# Patient Record
Sex: FEMALE | Race: WHITE | NOT HISPANIC OR LATINO | URBAN - METROPOLITAN AREA
[De-identification: names, ages, dates, MRNs, and addresses within clinical notes are randomized per-mention and may not be internally consistent; named-entity substitution may affect disease eponyms.]

---
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
## 2019-02-10 ENCOUNTER — EMERGENCY (EMERGENCY)
Facility: HOSPITAL | Age: 19
LOS: 1 days | Discharge: HOME | End: 2019-02-10
Attending: EMERGENCY MEDICINE
Payer: COMMERCIAL

## 2019-02-10 VITALS
HEART RATE: 89 BPM | SYSTOLIC BLOOD PRESSURE: 98 MMHG | DIASTOLIC BLOOD PRESSURE: 54 MMHG | RESPIRATION RATE: 18 BRPM | HEIGHT: 55 IN | WEIGHT: 98.11 LBS | OXYGEN SATURATION: 98 % | TEMPERATURE: 98 F

## 2019-02-10 PROBLEM — H91.90 UNSPECIFIED HEARING LOSS, UNSPECIFIED EAR: Chronic | Status: ACTIVE | Noted: 2017-04-20

## 2019-02-10 PROCEDURE — 73110 X-RAY EXAM OF WRIST: CPT | Mod: 26,LT

## 2019-02-10 PROCEDURE — 73110 X-RAY EXAM OF WRIST: CPT

## 2019-02-10 PROCEDURE — 70450 CT HEAD/BRAIN W/O DYE: CPT | Mod: 26

## 2019-02-10 PROCEDURE — 99284 EMERGENCY DEPT VISIT MOD MDM: CPT

## 2019-02-10 PROCEDURE — 70450 CT HEAD/BRAIN W/O DYE: CPT

## 2019-02-10 PROCEDURE — 99284 EMERGENCY DEPT VISIT MOD MDM: CPT | Mod: 25

## 2019-02-10 PROCEDURE — 73130 X-RAY EXAM OF HAND: CPT

## 2019-02-10 PROCEDURE — 73130 X-RAY EXAM OF HAND: CPT | Mod: 26,LT

## 2019-02-10 RX ORDER — ACETAMINOPHEN 500 MG
650 TABLET ORAL ONCE
Qty: 0 | Refills: 0 | Status: COMPLETED | OUTPATIENT
Start: 2019-02-10 | End: 2019-02-10

## 2019-02-10 RX ADMIN — Medication 650 MILLIGRAM(S): at 15:40

## 2019-02-10 NOTE — ED PROVIDER NOTE - CONSTITUTIONAL, MLM
normal... Well appearing, well nourished, awake, alert, oriented to person, place, time/situation and in no apparent distress. NAN sit on stretcher

## 2019-02-10 NOTE — ED PROVIDER NOTE - OBJECTIVE STATEMENT
20 y/o female PMh as below present in ER  states about 1 pm today as she was walking stairs  tripped and fell on her left wrist and hand and she is not sure if she hit the head to the wall or she did ever have LOC .  as of now she c/o left side of the headache rated 6/10 and on top of the head 20 y/o female PMh as below present in ER  states about 1 pm today as she was walking stairs  tripped and fell on her left wrist and hand and she is not sure if she hit the head to the wall or she did ever have LOC .  as of now she c/o left side of the headache rated 6/10 and on top eye and head . denies any other  injury or walking difficulty - blurry vision or double vision - N/V or using ay Asa- Plavix or blood thinner   currently on menstruation

## 2019-02-10 NOTE — ED PROVIDER NOTE - PHYSICAL EXAMINATION
left wrist / hand - base o the left thumb and snuffbox TTP - no left forearm superficial abrasion noted - no bony TTP over radio-ulnar and elbow area - ROm of the shoulder grossly intact  no cervical - thoracic and lumbar spine and para spinal muscle tTP    gait normal steady

## 2019-02-10 NOTE — ED PROVIDER NOTE - MEDICAL DECISION MAKING DETAILS
S/p fall With left wrist and hand pain a nd possible hit the left side of the head   x ray of the hand wrist and ct of the head - tylenol S/p fall With left wrist and hand pain a and possible hit the left side of the head   x ray of the hand wrist and ct of the head - tylenol

## 2019-02-10 NOTE — ED ADULT TRIAGE NOTE - CHIEF COMPLAINT QUOTE
Pt reports she fell while at work today, lost her balance on a step stool and fell. Pt c/o left forearm, left thumb and left hip. Unsure if she hit her head. Denies LOC.

## 2019-02-10 NOTE — ED PROVIDER NOTE - CARE PLAN
Principal Discharge DX:	Wrist pain, acute, left  Secondary Diagnosis:	Hand pain, left  Secondary Diagnosis:	Post-traumatic headache, not intractable, unspecified chronicity pattern

## 2019-02-10 NOTE — ED PROVIDER NOTE - ATTENDING CONTRIBUTION TO CARE
I, Christopher Manuel, performed a face to face bedside interview with this patient regarding history of present illness, review of symptoms and relevant past medical, social and family history.  I completed an independent physical examination. I have communicated the patient’s plan of care and disposition with the ACP.      20 y/o female PMh as below present in ER  states about 1 pm today as she was walking stairs  tripped and fell on her left wrist and hand and she is not sure if she hit the head to the wall or she did ever have LOC .  as of now she c/o left side of the headache; pe ncat neck supple left wrist tender to palp over scaphoid;  5/5 motor;  dx headache, wrist pain; splint and appropriate follow up with orthopedics

## 2019-02-10 NOTE — ED PROVIDER NOTE - PROGRESS NOTE DETAILS
all xray was negative - Ct head W.o any acute finding , apply the thumb spica for -will d/c f/u pcp and ortho

## 2019-07-26 NOTE — ED ADULT TRIAGE NOTE - WEIGHT IN LBS
----- Message from Nhan Dunlap MD sent at 7/22/2019 10:02 AM CDT -----  Please call the patient regarding her labs  Renal function improved.  Please restart candesartan.  We will keep monitoring     98.1

## 2020-01-03 NOTE — ED PROVIDER NOTE - CPE EDP SKIN NORM
"Please see multiple encounters from yesterday- in addition to symptoms patient was requesting refill of her Zofran and Oxycodone. Per note, \"Patient is calling back with continuing symptoms. Declined speaking to triage nurse but will call back if symptoms get worse. She was mainly calling to find out if a covering provider can refill her prescriptions mentioned in the [employer] message below. Please advise.\"    Will close encounter at this time, as patient is refusing to speak to triage nurse.     Claudine Chappell RN on 1/3/2020 at 2:42 PM    " normal...
